# Patient Record
Sex: FEMALE | Race: BLACK OR AFRICAN AMERICAN | Employment: STUDENT | URBAN - METROPOLITAN AREA
[De-identification: names, ages, dates, MRNs, and addresses within clinical notes are randomized per-mention and may not be internally consistent; named-entity substitution may affect disease eponyms.]

---
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
## 2024-04-16 ENCOUNTER — HOSPITAL ENCOUNTER (EMERGENCY)
Age: 19
Discharge: HOME | End: 2024-04-16
Attending: EMERGENCY MEDICINE
Payer: MEDICAID

## 2024-04-16 VITALS
HEART RATE: 72 BPM | TEMPERATURE: 98.2 F | RESPIRATION RATE: 16 BRPM | WEIGHT: 123.02 LBS | SYSTOLIC BLOOD PRESSURE: 125 MMHG | DIASTOLIC BLOOD PRESSURE: 69 MMHG | OXYGEN SATURATION: 98 %

## 2024-04-16 DIAGNOSIS — R11.2 NAUSEA AND VOMITING, UNSPECIFIED VOMITING TYPE: ICD-10-CM

## 2024-04-16 DIAGNOSIS — N39.0 URINARY TRACT INFECTION IN FEMALE: Primary | ICD-10-CM

## 2024-04-16 LAB
BACTERIA URNS QL MICRO: ABNORMAL
BILIRUB UR QL STRIP: NEGATIVE
CASTS #/AREA URNS LPF: ABNORMAL /LPF (ref 0–2)
CHP ED QC CHECK: NORMAL
CLARITY UR: ABNORMAL
COLOR UR: YELLOW
EPI CELLS #/AREA URNS HPF: ABNORMAL /HPF (ref 0–5)
GLUCOSE UR STRIP-MCNC: NEGATIVE MG/DL
HGB UR QL STRIP.AUTO: NEGATIVE
KETONES UR STRIP-MCNC: ABNORMAL MG/DL
LEUKOCYTE ESTERASE UR QL STRIP: ABNORMAL
NITRITE UR QL STRIP: NEGATIVE
PH UR STRIP: 6 [PH] (ref 5–8)
PREGNANCY TEST URINE, POC: NEGATIVE
PROT UR STRIP-MCNC: NEGATIVE MG/DL
RBC #/AREA URNS HPF: ABNORMAL /HPF (ref 0–2)
SP GR UR STRIP: 1.03 (ref 1–1.03)
UROBILINOGEN UR STRIP-ACNC: NORMAL EU/DL (ref 0–1)
WBC #/AREA URNS HPF: ABNORMAL /HPF (ref 0–5)

## 2024-04-16 PROCEDURE — 6370000000 HC RX 637 (ALT 250 FOR IP)

## 2024-04-16 PROCEDURE — 99283 EMERGENCY DEPT VISIT LOW MDM: CPT

## 2024-04-16 PROCEDURE — 81001 URINALYSIS AUTO W/SCOPE: CPT

## 2024-04-16 RX ORDER — CEPHALEXIN 250 MG/1
500 CAPSULE ORAL 4 TIMES DAILY
Qty: 32 CAPSULE | Refills: 0 | Status: SHIPPED | OUTPATIENT
Start: 2024-04-16 | End: 2024-04-20

## 2024-04-16 RX ORDER — ONDANSETRON 4 MG/1
4 TABLET, FILM COATED ORAL ONCE
Status: COMPLETED | OUTPATIENT
Start: 2024-04-16 | End: 2024-04-16

## 2024-04-16 RX ORDER — ONDANSETRON 4 MG/1
4 TABLET, FILM COATED ORAL EVERY 12 HOURS PRN
Qty: 5 TABLET | Refills: 0 | Status: SHIPPED | OUTPATIENT
Start: 2024-04-16

## 2024-04-16 RX ADMIN — ONDANSETRON HYDROCHLORIDE 4 MG: 4 TABLET, FILM COATED ORAL at 14:55

## 2024-04-16 ASSESSMENT — ENCOUNTER SYMPTOMS
RECTAL PAIN: 0
VOMITING: 1
NAUSEA: 1
CONSTIPATION: 0
BLOOD IN STOOL: 0
ABDOMINAL PAIN: 1
DIARRHEA: 1

## 2024-04-16 ASSESSMENT — PAIN - FUNCTIONAL ASSESSMENT: PAIN_FUNCTIONAL_ASSESSMENT: NONE - DENIES PAIN

## 2024-04-16 NOTE — DISCHARGE INSTRUCTIONS
-Complete your course of antibiotics  -You can use Tylenol for pain or fever.  -You can use the prescribed Zofran for vomiting  -Drink plenty of fluids at least 2 to 3 L  -Please follow-up with PCP in 7 days  -Reason for return to ED: If you develop severe unremitting abdominal pain, unable to drink because of nausea and vomiting, your symptom worsens despite antibiotic therapy.

## 2024-04-16 NOTE — ED PROVIDER NOTES
[facility] ED  Emergency Department Encounter  Emergency Medicine Resident     Pt Name:Beatrice Agee  MRN: [id number]  Birthdate 2005  Date of evaluation: 4/16/24  PCP:  Corby Childress  Note Started: 3:44 PM EDT      CHIEF COMPLAINT       Chief Complaint   Patient presents with    Abdominal Pain    Emesis       HISTORY OF PRESENT ILLNESS  (Location/Symptom, Timing/Onset, Context/Setting, Quality, Duration, Modifying Factors, Severity.)      Beatrice Agee is a 18-year-old female presented with complaints of abdominal pain started this morning associated with nausea and vomiting and increased stool frequency, she had 5 episodes of nonbloody nonbilious vomiting till now, she mentioned that her boyfriend was sick with similar symptoms for the last 2 days, denies any vaginal discharge, urinary symptoms, lower abdominal pain.  LMP was 10 days ago.    PAST MEDICAL / SURGICAL / SOCIAL / FAMILY HISTORY      has no past medical history on file.       has no past surgical history on file.      Social History     Socioeconomic History    Marital status: Single     Spouse name: Not on file    Number of children: Not on file    Years of education: Not on file    Highest education level: Not on file   Occupational History    Not on file   Tobacco Use    Smoking status: Light Smoker    Smokeless tobacco: Never   Substance and Sexual Activity    Alcohol use: Never    Drug use: Yes     Types: Marijuana (Weed), Other-see comments     Comment: occasionally    Sexual activity: Not on file   Other Topics Concern    Not on file   Social History Narrative    Not on file     Social Determinants of Health     Financial Resource Strain: Not on file   Food Insecurity: Not on file   Transportation Needs: Not on file   Physical Activity: Not on file   Stress: Not on file   Social Connections: Not on file   Intimate Partner Violence: Not on file   Housing Stability: Not on file       No family history on

## 2024-04-16 NOTE — ED NOTES
Pt is A+Ox4  Pt complains of abdominal pain with vomiting since morning  Pt states she has the chills  Pt denies any diarrhea  Pt denies headache  Pt ambulates with a steady gait from triage to the doc  All questions answered and needs met at this time

## 2024-04-16 NOTE — ED PROVIDER NOTES
[facility]     Emergency Department     Faculty Attestation    I performed a history and physical examination of the patient and discussed management with the resident. I have reviewed and agree with the resident’s findings including all diagnostic interpretations, and treatment plans as written at the time of my review. Any areas of disagreement are noted on the chart. I was personally present for the key portions of any procedures. I have documented in the chart those procedures where I was not present during the key portions. For Physician Assistant/ Nurse Practitioner cases/documentation I have personally evaluated this patient and have completed at least one if not all key elements of the E/M (history, physical exam, and MDM). Additional findings are as noted.    PtName: Beatrice Agee  MRN: [id number]  Birthdate 2005  Date of evaluation: 4/16/24  Note Started: 2:58 PM EDT    Primary Care Physician: Corby Childress        History: This is a 18 y.o. female who presents to the Emergency Department with complaint of nausea vomiting abdominal pain.  Began earlier today.  Patient states she has had 5 episodes of emesis.  Patient states her significant other is home ill with similar symptoms that started yesterday.  She denies any dysuria, frequency or urgency.  She denies any vaginal bleeding or discharge.  She does complain of some loose stools.    Physical:   weight is 55.8 kg (123 lb 0.3 oz). Her oral temperature is 98.2 °F (36.8 °C). Her blood pressure is 125/69 and her pulse is 72. Her respiration is 16 and oxygen saturation is 98%.  Patient is awake alert nontoxic-appearing, abdomen is soft she has some minimal epigastric tenderness is no guarding or rebound no pulsatile abdominal mass no lower abdominal tenderness on examination    Impression: Viral illness    Plan: Urinalysis, Zofran      Medical Decision Making  Problems Addressed:  Nausea and